# Patient Record
Sex: FEMALE | Race: WHITE | HISPANIC OR LATINO | URBAN - METROPOLITAN AREA
[De-identification: names, ages, dates, MRNs, and addresses within clinical notes are randomized per-mention and may not be internally consistent; named-entity substitution may affect disease eponyms.]

---

## 2019-09-01 ENCOUNTER — EMERGENCY (EMERGENCY)
Facility: HOSPITAL | Age: 39
LOS: 1 days | Discharge: HOME | End: 2019-09-01
Attending: EMERGENCY MEDICINE | Admitting: EMERGENCY MEDICINE
Payer: COMMERCIAL

## 2019-09-01 VITALS
RESPIRATION RATE: 14 BRPM | DIASTOLIC BLOOD PRESSURE: 132 MMHG | SYSTOLIC BLOOD PRESSURE: 132 MMHG | OXYGEN SATURATION: 100 % | HEART RATE: 86 BPM

## 2019-09-01 VITALS
SYSTOLIC BLOOD PRESSURE: 118 MMHG | DIASTOLIC BLOOD PRESSURE: 64 MMHG | HEART RATE: 82 BPM | RESPIRATION RATE: 16 BRPM | TEMPERATURE: 98 F | OXYGEN SATURATION: 100 %

## 2019-09-01 LAB
ALBUMIN SERPL ELPH-MCNC: 4.3 G/DL — SIGNIFICANT CHANGE UP (ref 3.3–5)
ALP SERPL-CCNC: 59 U/L — SIGNIFICANT CHANGE UP (ref 40–120)
ALT FLD-CCNC: 8 U/L — SIGNIFICANT CHANGE UP (ref 4–33)
AMPHET UR-MCNC: NEGATIVE — SIGNIFICANT CHANGE UP
ANION GAP SERPL CALC-SCNC: 15 MMO/L — HIGH (ref 7–14)
APAP SERPL-MCNC: < 15 UG/ML — LOW (ref 15–25)
AST SERPL-CCNC: 12 U/L — SIGNIFICANT CHANGE UP (ref 4–32)
BARBITURATES UR SCN-MCNC: NEGATIVE — SIGNIFICANT CHANGE UP
BASOPHILS # BLD AUTO: 0.08 K/UL — SIGNIFICANT CHANGE UP (ref 0–0.2)
BASOPHILS NFR BLD AUTO: 1.1 % — SIGNIFICANT CHANGE UP (ref 0–2)
BENZODIAZ UR-MCNC: NEGATIVE — SIGNIFICANT CHANGE UP
BILIRUB SERPL-MCNC: < 0.2 MG/DL — LOW (ref 0.2–1.2)
BUN SERPL-MCNC: 10 MG/DL — SIGNIFICANT CHANGE UP (ref 7–23)
CALCIUM SERPL-MCNC: 9.1 MG/DL — SIGNIFICANT CHANGE UP (ref 8.4–10.5)
CANNABINOIDS UR-MCNC: NEGATIVE — SIGNIFICANT CHANGE UP
CHLORIDE SERPL-SCNC: 108 MMOL/L — HIGH (ref 98–107)
CO2 SERPL-SCNC: 22 MMOL/L — SIGNIFICANT CHANGE UP (ref 22–31)
COCAINE METAB.OTHER UR-MCNC: NEGATIVE — SIGNIFICANT CHANGE UP
CREAT SERPL-MCNC: 0.83 MG/DL — SIGNIFICANT CHANGE UP (ref 0.5–1.3)
EOSINOPHIL # BLD AUTO: 0.09 K/UL — SIGNIFICANT CHANGE UP (ref 0–0.5)
EOSINOPHIL NFR BLD AUTO: 1.2 % — SIGNIFICANT CHANGE UP (ref 0–6)
ETHANOL BLD-MCNC: 64 MG/DL — HIGH
GLUCOSE SERPL-MCNC: 94 MG/DL — SIGNIFICANT CHANGE UP (ref 70–99)
HCG SERPL-ACNC: < 5 MIU/ML — SIGNIFICANT CHANGE UP
HCT VFR BLD CALC: 35.5 % — SIGNIFICANT CHANGE UP (ref 34.5–45)
HGB BLD-MCNC: 10.5 G/DL — LOW (ref 11.5–15.5)
IMM GRANULOCYTES NFR BLD AUTO: 0.4 % — SIGNIFICANT CHANGE UP (ref 0–1.5)
LYMPHOCYTES # BLD AUTO: 2.62 K/UL — SIGNIFICANT CHANGE UP (ref 1–3.3)
LYMPHOCYTES # BLD AUTO: 36 % — SIGNIFICANT CHANGE UP (ref 13–44)
MCHC RBC-ENTMCNC: 22.6 PG — LOW (ref 27–34)
MCHC RBC-ENTMCNC: 29.6 % — LOW (ref 32–36)
MCV RBC AUTO: 76.5 FL — LOW (ref 80–100)
METHADONE UR-MCNC: NEGATIVE — SIGNIFICANT CHANGE UP
MONOCYTES # BLD AUTO: 0.64 K/UL — SIGNIFICANT CHANGE UP (ref 0–0.9)
MONOCYTES NFR BLD AUTO: 8.8 % — SIGNIFICANT CHANGE UP (ref 2–14)
NEUTROPHILS # BLD AUTO: 3.82 K/UL — SIGNIFICANT CHANGE UP (ref 1.8–7.4)
NEUTROPHILS NFR BLD AUTO: 52.5 % — SIGNIFICANT CHANGE UP (ref 43–77)
NRBC # FLD: 0 K/UL — SIGNIFICANT CHANGE UP (ref 0–0)
OPIATES UR-MCNC: NEGATIVE — SIGNIFICANT CHANGE UP
OXYCODONE UR-MCNC: NEGATIVE — SIGNIFICANT CHANGE UP
PCP UR-MCNC: NEGATIVE — SIGNIFICANT CHANGE UP
PLATELET # BLD AUTO: 296 K/UL — SIGNIFICANT CHANGE UP (ref 150–400)
PMV BLD: 9.5 FL — SIGNIFICANT CHANGE UP (ref 7–13)
POTASSIUM SERPL-MCNC: 3.4 MMOL/L — LOW (ref 3.5–5.3)
POTASSIUM SERPL-SCNC: 3.4 MMOL/L — LOW (ref 3.5–5.3)
PROT SERPL-MCNC: 7.3 G/DL — SIGNIFICANT CHANGE UP (ref 6–8.3)
RBC # BLD: 4.64 M/UL — SIGNIFICANT CHANGE UP (ref 3.8–5.2)
RBC # FLD: 16 % — HIGH (ref 10.3–14.5)
SALICYLATES SERPL-MCNC: < 5 MG/DL — LOW (ref 15–30)
SODIUM SERPL-SCNC: 145 MMOL/L — SIGNIFICANT CHANGE UP (ref 135–145)
TROPONIN T, HIGH SENSITIVITY: < 6 NG/L — SIGNIFICANT CHANGE UP (ref ?–14)
TSH SERPL-MCNC: 1.44 UIU/ML — SIGNIFICANT CHANGE UP (ref 0.27–4.2)
WBC # BLD: 7.28 K/UL — SIGNIFICANT CHANGE UP (ref 3.8–10.5)
WBC # FLD AUTO: 7.28 K/UL — SIGNIFICANT CHANGE UP (ref 3.8–10.5)

## 2019-09-01 PROCEDURE — 93010 ELECTROCARDIOGRAM REPORT: CPT

## 2019-09-01 PROCEDURE — 99283 EMERGENCY DEPT VISIT LOW MDM: CPT | Mod: 25

## 2019-09-01 RX ORDER — SODIUM CHLORIDE 9 MG/ML
1000 INJECTION INTRAMUSCULAR; INTRAVENOUS; SUBCUTANEOUS ONCE
Refills: 0 | Status: COMPLETED | OUTPATIENT
Start: 2019-09-01 | End: 2019-09-01

## 2019-09-01 RX ORDER — ASPIRIN/CALCIUM CARB/MAGNESIUM 324 MG
162 TABLET ORAL DAILY
Refills: 0 | Status: DISCONTINUED | OUTPATIENT
Start: 2019-09-01 | End: 2019-09-11

## 2019-09-01 RX ADMIN — SODIUM CHLORIDE 1000 MILLILITER(S): 9 INJECTION INTRAMUSCULAR; INTRAVENOUS; SUBCUTANEOUS at 05:04

## 2019-09-01 RX ADMIN — SODIUM CHLORIDE 1000 MILLILITER(S): 9 INJECTION INTRAMUSCULAR; INTRAVENOUS; SUBCUTANEOUS at 03:41

## 2019-09-01 RX ADMIN — Medication 162 MILLIGRAM(S): at 03:46

## 2019-09-01 NOTE — ED PROVIDER NOTE - PATIENT PORTAL LINK FT
You can access the FollowMyHealth Patient Portal offered by [facility] by registering at the following website: [email or web address]. By joining [facility]’s FollowMyHealth portal, you will also be able to view your health information using other applications (apps) compatible with our system.

## 2019-09-01 NOTE — ED PROVIDER NOTE - NSFOLLOWUPINSTRUCTIONS_ED_ALL_ED_FT
You were seen in the emergency department because you were found to be altered mentally which improved with rest and fluids.     Please follow-up with your primary care doctor in the next 24-48 hours     Please follow-up with your therapist within the next week     If you have any worsening symptoms, severe chest pain, difficulty breathing or you have any thoughts of hurting yourself please return to the emergency department

## 2019-09-01 NOTE — ED PROVIDER NOTE - NSFOLLOWUPCLINICS_GEN_ALL_ED_FT
[facility] Psych Dept of Substance Abuse  Psychiatry Substance Abuse  [address] [place] [address]  Phone: [phone]  Fax:   Follow Up Time:

## 2019-09-01 NOTE — ED ADULT NURSE NOTE - INTERVENTIONS DEFINITIONS
Instruct patient to call for assistance/Physically safe environment: no spills, clutter or unnecessary equipment/Monitor gait and stability/Call bell, personal items and telephone within reach/Reinforce activity limits and safety measures with patient and family/Monitor for mental status changes and reorient to person, place, and time/[place] to call system/Stretcher in lowest position, wheels locked, appropriate side rails in place

## 2019-09-01 NOTE — ED PROVIDER NOTE - OBJECTIVE STATEMENT
39yF with h/o of anxiety and depression BIBEMS due to being unresponsive and difficulty breathing. Per EMS: Patient was found by son crying in room and not breathing well after going out drinking with some friends in which EMS was called. Patient endorse taking a "mutiple small white pills" that she normally takes for her anxiety and depression. Normally takes 1 but couldn't really quantify how many she took. States she took multiple because she felt extra sad and did not have any intent to harm herself. Received 2mg of Narcan en route. 40 y/o F with h/o of anxiety (on xanax) BIBEMS due to being unresponsive. Per EMS: Patient was found by son crying in room after returning home after a night out drinking with some friends.  Patient endorses having 4 drinks tonight and also taking a few of her anxiety medications (unclear amount but states it was around 5).  Pt endorses feeling depressed, but denies any SI/HI.  Per EMS, pt is currently undergoing separation from her .   is home with children at present - he reports that she takes xanax for anxiety.  FS in the field 100 and received IV narcan 2mg without response prior to arrival.

## 2019-09-01 NOTE — ED PROVIDER NOTE - PHYSICAL EXAMINATION
Gen: AAOx3,drowsy but arousable and able to follow commands   Head: NCAT  HEENT: EOMI, oral mucosa moist, normal conjunctiva  Lung: CTAB, no respiratory distress, speaking in full sentences  CV: RRR, no murmurs, rubs or gallops  Abd: soft, NTND, no guarding,   MSK: no visible deformities  Skin: Warm, well perfused, no rash  ~Ben Irvin M.D. Resident

## 2019-09-01 NOTE — ED PROVIDER NOTE - CLINICAL SUMMARY MEDICAL DECISION MAKING FREE TEXT BOX
39yF with h/o of anxiety and depression BIBEMS due to being unresponsive and difficulty breathing after a night of drinking with friends and taking unknown amount of pills for anxiety and depression. Will evaluate with EKG, labs, tox and reassess 39yF with h/o of anxiety BIBEMS for unresponsiveness.  Pt is arousable on arrival, VSS, awake and answering questions.  (+)etoh and benzo use tonight.  Atraumatic exam.  Plan for labs, ekg, tox screen, hcg, reassess for sobriety.  Pt endorsing depression, but no suicidality at present, will need to be re-evaluated once sober.

## 2019-09-01 NOTE — ED PROVIDER NOTE - PROGRESS NOTE DETAILS
Albaro RINCON:  present and states that they are currently in the middle of a divorce and believe she takes Alprazolam 0.25mg reassessed patient who is now awake and clinically sober. patient is behaving appropriately. denies any SI/HI. reports having a panic attack last night that she tried to control with xanax but was unable to. offered mental health resources but patient declined, stating that she has a therapist that she sees. will discharge home. ex- will pick patient up. - resident Óscar Martines

## 2019-09-01 NOTE — ED ADULT NURSE REASSESSMENT NOTE - NS ED NURSE REASSESS COMMENT FT1
Pt sleeping on stretcher, respirations even and unlabored, NAD noted. NSR on cardiac monitor, 99 O2 sat. family remains at bedside. will continue to monitor
pt resting, HR noted to increase to 130's MD [place] aware, 1L NS bolus hung, infusing well. pt awoke, c/o palpitations, chest pain 8/10. pt medicated per verbal MD order, rpt EKG completed, trop sent. pt states pain has no subsided, HR return to 90s pt remains on cardiac monitor.
pt resting, arousable to strong verbal stimuli. respirations even and non labored. VSS. ex- and father at bedside. pt still to lethargic to obtain urine sample.
pt A&Ox4, endorses need to use restroom. pt sat up, denies dizziness, lightheadedness. pt ambulated to restroom accompanied by float RN. urine sent to lab.

## 2019-09-01 NOTE — ED ADULT NURSE NOTE - OBJECTIVE STATEMENT
pt received to room trB, responsive to painful stimuli. per EMS pt was out drinking with some friends, came home and was very upset. pt consoled by son who noticed she was very lethargic and not breathing right. PMH anxiety, takes "1 small white pill" every day and took several addition pills this evening. pt denies suicidal ideation or intent, present or in past. pt given 2mg Narcan by EMS, FS in field 100. 18G IV noted to left hand placed by EMS flushes well, 20G IV placed R AC by smita RN, labs drawn and sent.       per ex- pt called him today endorsing loneliness, frustration about life stressors and endorsed suicidal ideation. shows picture of medication "alprazolam 0.25mg" pt received to room trB, responsive to painful stimuli. per EMS pt was out drinking with some friends, came home and was very upset. pt consoled by son who noticed she was very lethargic and not breathing right. PMH anxiety, takes "1 small white pill" every day and took several addition pills this evening. pt denies suicidal ideation or intent, present or in past. pt given 2mg Narcan by EMS, FS in field 100. 18G IV noted to left hand placed by EMS flushes well, 20G IV placed R AC by smita RN, labs drawn and sent.     per ex- pt called him today endorsing loneliness, frustration about life stressors and endorsed suicidal ideation. shows picture of medication "alprazolam 0.25mg"

## 2019-11-16 ENCOUNTER — TRANSCRIPTION ENCOUNTER (OUTPATIENT)
Age: 39
End: 2019-11-16

## 2019-11-19 ENCOUNTER — RESULT REVIEW (OUTPATIENT)
Age: 39
End: 2019-11-19

## 2020-01-08 ENCOUNTER — RESULT REVIEW (OUTPATIENT)
Age: 40
End: 2020-01-08

## 2020-07-07 ENCOUNTER — RESULT REVIEW (OUTPATIENT)
Age: 40
End: 2020-07-07

## 2021-02-09 ENCOUNTER — RESULT REVIEW (OUTPATIENT)
Age: 41
End: 2021-02-09

## 2023-06-15 NOTE — ED PROVIDER NOTE - MUSCULOSKELETAL, MLM
Spine appears normal, range of motion is not limited, no muscle or joint tenderness Tone is normal, moving all extremities well, reflexes normal for age.